# Patient Record
Sex: MALE | Race: BLACK OR AFRICAN AMERICAN | NOT HISPANIC OR LATINO | Employment: PART TIME | URBAN - METROPOLITAN AREA
[De-identification: names, ages, dates, MRNs, and addresses within clinical notes are randomized per-mention and may not be internally consistent; named-entity substitution may affect disease eponyms.]

---

## 2021-07-01 ENCOUNTER — HOSPITAL ENCOUNTER (EMERGENCY)
Facility: HOSPITAL | Age: 31
Discharge: HOME | End: 2021-07-01
Attending: EMERGENCY MEDICINE

## 2021-07-01 VITALS
BODY MASS INDEX: 21.05 KG/M2 | WEIGHT: 147 LBS | OXYGEN SATURATION: 100 % | RESPIRATION RATE: 20 BRPM | HEART RATE: 90 BPM | SYSTOLIC BLOOD PRESSURE: 102 MMHG | TEMPERATURE: 98 F | DIASTOLIC BLOOD PRESSURE: 63 MMHG | HEIGHT: 70 IN

## 2021-07-01 DIAGNOSIS — R10.9 ABDOMINAL PAIN: ICD-10-CM

## 2021-07-01 DIAGNOSIS — K59.00 CONSTIPATION, UNSPECIFIED CONSTIPATION TYPE: Primary | ICD-10-CM

## 2021-07-01 PROCEDURE — 99283 EMERGENCY DEPT VISIT LOW MDM: CPT | Mod: 25

## 2021-07-01 RX ORDER — POLYETHYLENE GLYCOL 3350 17 G/17G
17 POWDER, FOR SOLUTION ORAL DAILY
Qty: 10 EACH | Refills: 0 | Status: SHIPPED | OUTPATIENT
Start: 2021-07-01

## 2022-03-01 ENCOUNTER — HOSPITAL ENCOUNTER (EMERGENCY)
Facility: HOSPITAL | Age: 32
Discharge: HOME | End: 2022-03-01
Attending: EMERGENCY MEDICINE

## 2022-03-01 VITALS
RESPIRATION RATE: 16 BRPM | TEMPERATURE: 99 F | SYSTOLIC BLOOD PRESSURE: 132 MMHG | OXYGEN SATURATION: 99 % | DIASTOLIC BLOOD PRESSURE: 72 MMHG | HEART RATE: 65 BPM

## 2022-03-01 DIAGNOSIS — R41.82 ALTERED MENTAL STATUS, UNSPECIFIED ALTERED MENTAL STATUS TYPE: Primary | ICD-10-CM

## 2022-03-01 DIAGNOSIS — F12.920 CANNABIS INTOXICATION WITHOUT COMPLICATION: ICD-10-CM

## 2022-03-01 LAB
POCT GLUCOSE: 200 MG/DL (ref 70–110)
POCT GLUCOSE: <20 MG/DL (ref 70–110)

## 2022-03-01 PROCEDURE — 96374 THER/PROPH/DIAG INJ IV PUSH: CPT

## 2022-03-01 PROCEDURE — 99284 EMERGENCY DEPT VISIT MOD MDM: CPT | Mod: 25

## 2022-03-01 PROCEDURE — 82962 GLUCOSE BLOOD TEST: CPT

## 2022-03-01 PROCEDURE — 25000003 PHARM REV CODE 250: Performed by: EMERGENCY MEDICINE

## 2022-03-01 PROCEDURE — 63600175 PHARM REV CODE 636 W HCPCS: Performed by: EMERGENCY MEDICINE

## 2022-03-01 RX ORDER — ONDANSETRON 2 MG/ML
4 INJECTION INTRAMUSCULAR; INTRAVENOUS
Status: COMPLETED | OUTPATIENT
Start: 2022-03-01 | End: 2022-03-01

## 2022-03-01 RX ORDER — ONDANSETRON 4 MG/1
4 TABLET, FILM COATED ORAL EVERY 8 HOURS PRN
Qty: 6 TABLET | Refills: 0 | Status: SHIPPED | OUTPATIENT
Start: 2022-03-01

## 2022-03-01 RX ORDER — LORAZEPAM 0.5 MG/1
1 TABLET ORAL
Status: DISCONTINUED | OUTPATIENT
Start: 2022-03-01 | End: 2022-03-01

## 2022-03-01 RX ORDER — SODIUM CHLORIDE 9 MG/ML
1000 INJECTION, SOLUTION INTRAVENOUS
Status: COMPLETED | OUTPATIENT
Start: 2022-03-01 | End: 2022-03-01

## 2022-03-01 RX ADMIN — SODIUM CHLORIDE 1000 ML: 0.9 INJECTION, SOLUTION INTRAVENOUS at 01:03

## 2022-03-01 RX ADMIN — ONDANSETRON 4 MG: 2 INJECTION INTRAMUSCULAR; INTRAVENOUS at 01:03

## 2022-03-01 NOTE — ED PROVIDER NOTES
"Encounter Date: 3/1/2022       History     Chief Complaint   Patient presents with    Altered Mental Status     Pt arrives via EMS c/o possible overdose.  Upon arrival pt states that he feels very sleepy and is thirsty, pt admits to eating a large amount of THC  "gummies", Pt is a/o x 3 to person, place and time, resp easy, skin w/d.       32M HIV+ on Symtuza (unk recent CD4 or VL) presents with "I don't feel good," thirst, nauseated, sleepiness, "my boyfriend thinks I'm super high." States he felt fine until earlier today when a friend gave him marijuana edibles (3 gummy worms), after ingesting these he started to get sick. Denies pain anywhere on his body at this time. Felt fine prior to taking gummies - no recent illnesses or injuries, no falls or other head trauma, no fever chills URI symptoms headache neck pain cough SOB. Denies using any other drugs or drinking alcohol today. He has never taking edibles before.        Review of patient's allergies indicates:  Not on File  Past Medical History:   Diagnosis Date    HIV (human immunodeficiency virus infection)      No past surgical history on file.  No family history on file.  Social History     Tobacco Use    Smoking status: Never Smoker    Smokeless tobacco: Never Used   Substance Use Topics    Alcohol use: Never    Drug use: Never     Review of Systems   Constitutional: Negative for chills and fever.   Respiratory: Negative for cough and shortness of breath.    Cardiovascular: Negative for chest pain.   Gastrointestinal: Positive for nausea. Negative for abdominal pain.   Musculoskeletal: Negative for back pain, myalgias and neck pain.   Neurological: Negative for seizures and headaches.        Sleepy   All other systems reviewed and are negative.      Physical Exam     Initial Vitals   BP Pulse Resp Temp SpO2   03/01/22 1316 03/01/22 1316 03/01/22 1316 03/01/22 1316 03/01/22 1700   130/68 68 14 97.6 °F (36.4 °C) 99 %      MAP       --            " "    Physical Exam    Vitals reviewed.  Constitutional: He appears well-developed and well-nourished.   Breathing rapidly in and out of emesis bag   HENT:   Head: Normocephalic and atraumatic.   Eyes: EOM are normal. Pupils are equal, round, and reactive to light.   Neck:   Normal range of motion.  Cardiovascular: Normal rate and regular rhythm.   Pulmonary/Chest: Breath sounds normal. No respiratory distress.   Abdominal: Abdomen is soft. There is no abdominal tenderness.   Musculoskeletal:         General: Normal range of motion.      Cervical back: Normal range of motion.     Neurological: He is alert and oriented to person, place, and time.   Skin: Skin is warm and dry.   Psychiatric:   anxious         ED Course   Procedures  Labs Reviewed   POCT GLUCOSE - Abnormal; Notable for the following components:       Result Value    POCT Glucose <20 (*)     All other components within normal limits   POCT GLUCOSE - Abnormal; Notable for the following components:    POCT Glucose 200 (*)     All other components within normal limits   POCT GLUCOSE MONITORING CONTINUOUS          Imaging Results    None          Medications   ondansetron injection 4 mg (4 mg Intravenous Given 3/1/22 1335)   0.9%  NaCl infusion (1,000 mLs Intravenous New Bag 3/1/22 1335)     Medical Decision Making:   Initial Assessment:   32M HIV+ on Symtuza (?CD4 or VL) presents with sleepiness, "feeling bad," nausea after taking 3 marijuana edibles earlier today. VS and physical exam as above.    Plan:  -Fingerstick glucose  -Supportive care w Zofran, Ativan, NS bolus  -Serial exams. Consider broadening workup for failure to improve with supportive care.    5:00pm  We have observed patient for 3.5 hours in the ER. He has been sleeping on and off, resting comfortably, is no longer nauseous or vomiting. Able to drink liquids without difficulty. States he feels he is ready to go home. Will ensure he is able to walk with steady gait, and if so will DC in care " of boyfriend and sister at bedside. Have prescribed short course of Zofran to use prn for the next day or two. Careful RTED instructions given.     Clinical Impression:   Final diagnoses:  [R41.82] Altered mental status, unspecified altered mental status type (Primary)  [F12.920] Cannabis intoxication without complication                 Sofi Whalen MD  03/01/22 [id number]

## 2022-03-01 NOTE — DISCHARGE INSTRUCTIONS
You were seen in the ER today for nausea and sleepiness after taking marijuana edibles. Your vital signs and physical exam were normal. You were given some nausea medication and IV fluids.    Edible marijuana can take hours to fully leave your system. Please rest today and avoid any strenuous activity. Drink plenty of non-alcoholic liquids. You can take Zofran as prescribed for any nausea or vomiting.     Edibles have very unpredictable dosing, side effects, and duration of symptoms. Please consider not using these in the future. If you do choose to use these in the future, always start with a tiny amount to see how it affects you before consuming multiple edibles.     Return to the ER for fever over 100, if you aren't able to keep liquids down due to vomiting, for severe headache or confusion, or any new or worsening symptoms.